# Patient Record
Sex: FEMALE | Race: OTHER | URBAN - METROPOLITAN AREA
[De-identification: names, ages, dates, MRNs, and addresses within clinical notes are randomized per-mention and may not be internally consistent; named-entity substitution may affect disease eponyms.]

---

## 2023-10-11 ENCOUNTER — APPOINTMENT (RX ONLY)
Dept: URBAN - METROPOLITAN AREA CLINIC 81 | Facility: CLINIC | Age: 83
Setting detail: DERMATOLOGY
End: 2023-10-11

## 2023-10-11 DIAGNOSIS — L57.0 ACTINIC KERATOSIS: ICD-10-CM

## 2023-10-11 PROCEDURE — ? PRESCRIPTION MEDICATION MANAGEMENT

## 2023-10-11 PROCEDURE — ? PRESCRIPTION

## 2023-10-11 PROCEDURE — ? COUNSELING

## 2023-10-11 PROCEDURE — 99204 OFFICE O/P NEW MOD 45 MIN: CPT

## 2023-10-11 RX ORDER — FLUOROURACIL 5 MG/G
CREAM TOPICAL TWICE DAILY
Qty: 40 | Refills: 1 | Status: ERX | COMMUNITY
Start: 2023-10-11

## 2023-10-11 RX ORDER — HYDROCORTISONE 25 MG/G
OINTMENT TOPICAL
Qty: 28.35 | Refills: 1 | Status: ERX | COMMUNITY
Start: 2023-10-11

## 2023-10-11 RX ADMIN — FLUOROURACIL: 5 CREAM TOPICAL at 00:00

## 2023-10-11 RX ADMIN — HYDROCORTISONE: 25 OINTMENT TOPICAL at 00:00

## 2023-10-11 ASSESSMENT — LOCATION DETAILED DESCRIPTION DERM
LOCATION DETAILED: RIGHT INFERIOR CENTRAL MALAR CHEEK
LOCATION DETAILED: LEFT INFERIOR MEDIAL MALAR CHEEK
LOCATION DETAILED: NASAL SUPRATIP
LOCATION DETAILED: LEFT MEDIAL FOREHEAD

## 2023-10-11 ASSESSMENT — LOCATION SIMPLE DESCRIPTION DERM
LOCATION SIMPLE: LEFT CHEEK
LOCATION SIMPLE: NOSE
LOCATION SIMPLE: LEFT FOREHEAD
LOCATION SIMPLE: RIGHT CHEEK

## 2023-10-11 ASSESSMENT — LOCATION ZONE DERM
LOCATION ZONE: NOSE
LOCATION ZONE: FACE

## 2023-10-11 NOTE — PROCEDURE: PRESCRIPTION MEDICATION MANAGEMENT
Detail Level: Zone
Plan: Follow up 3 weeks
Initiate Treatment: Pt to begin applying effudex 5% cream BID to affected areas on forehead, nose and cheeks x 2 weeks. \\nAfter treatment pt to apply hydrocortisone 2.5% ointment to treated areas BID x 1 week.
Render In Strict Bullet Format?: No

## 2023-10-17 ENCOUNTER — APPOINTMENT (RX ONLY)
Dept: URBAN - METROPOLITAN AREA CLINIC 81 | Facility: CLINIC | Age: 83
Setting detail: DERMATOLOGY
End: 2023-10-17

## 2023-10-17 DIAGNOSIS — Z41.9 ENCOUNTER FOR PROCEDURE FOR PURPOSES OTHER THAN REMEDYING HEALTH STATE, UNSPECIFIED: ICD-10-CM

## 2023-10-17 PROCEDURE — ? COSMETIC CONSULTATION: LASER RESURFACING

## 2023-10-17 PROCEDURE — ? RECOMMENDATIONS

## 2023-10-17 PROCEDURE — ? COSMETIC QUOTE

## 2023-10-17 NOTE — PROCEDURE: COSMETIC QUOTE
Injectable  19 Units: 0
Misc 5 Free Text Discount (In Dollars- Use Only Numbers And Decimals): 0.00
Injectable 1 Price/Unit (In Dollars- Use Only Numbers And Decimals): 650.00
Laser 13: IPL Photofacial-Face, Neck, Chest
Laser 6 Price/Unit (In Dollars- Use Only Numbers And Decimals): 1500.00
Injectable 5: Volbella 0.55cc
Misc Procedure 2 Price/Unit (In Dollars- Use Only Numbers And Decimals): 200.00
Injectable  12 Price/Unit (In Dollars- Use Only Numbers And Decimals): 12.00
Laser 10: CO2RE Intima
Misc Procedure 6: Threads Browlift
Injectable 9 Price/Unit (In Dollars- Use Only Numbers And Decimals): 800.00
Additional Note (Will Following Above Verbiage): Laser includes PRP and Defenage. Threads discount applied to lift only. If lift with 12 threads, will include 20 mono at no charge. \\n\\nPatient to pay deposit for Nov treatment. If decides for Dec, then must pay in full with no refund.
Laser 18 Price/Unit (In Dollars- Use Only Numbers And Decimals): 2000.00
Include Sales Tax On Surgeon's Fees: Yes
Injectable  13: Kybella/Vial
Laser 3 Price/Unit (In Dollars- Use Only Numbers And Decimals): 1800.00
Injectable 2: Versa
Apply Facility Fee: No
Face Procedure 1 Price/Unit (In Dollars- Use Only Numbers And Decimals): 250.00
Laser 7: VV/PPP on Genitals
Misc Procedure 3: Collagen Stimulating threads Per thread only
Laser 19: RUSH TROTTER
Misc Procedure 3 Units: [address]
Injectable 6 Price/Unit (In Dollars- Use Only Numbers And Decimals): 795.00
Misc Procedure 1: Threadlift Per Thread
Injectable 10: RHA 3
Misc Procedure 7 Price/Unit (In Dollars- Use Only Numbers And Decimals): 50.00
Injectable  14: Qwo/Session
Injectable 10 Price/Unit (In Dollars- Use Only Numbers And Decimals): 750.00
Injectable 3: Voluma
Laser 15 Price/Unit (In Dollars- Use Only Numbers And Decimals): 300.00
Misc Procedure 4: Threads Neck Lift
Laser 4 Price/Unit (In Dollars- Use Only Numbers And Decimals): 1250.00
Laser 8: Profound-Cheek Scarring
Laser 16 Price/Unit (In Dollars- Use Only Numbers And Decimals): 400.00
Laser 1 Price/Unit (In Dollars- Use Only Numbers And Decimals): 2500.00
Injectable  11: Botox
Laser 12 Price/Unit (In Dollars- Use Only Numbers And Decimals): 900.00
Laser 5: Lesion Removal
Laser 17: LHR-Arms
Injectable 8: [place]ro/Skinvive
Misc Procedure 1 Units: 12
Misc 1 Percentage Discount: 10
Laser 2: Full Face and Neck-Fusion
Laser 9 Price/Unit (In Dollars- Use Only Numbers And Decimals): 1000.00
Misc Procedure 2: Collagen threads-Lip - lips
Laser 2 Price/Unit (In Dollars- Use Only Numbers And Decimals): 3000.00
Injectable  12: Xeomin
Laser 6: Sk Removal-Face and Neck
Laser 18: LHR-Legs/Back-TBD
Injectable 5 Price/Unit (In Dollars- Use Only Numbers And Decimals): 395.00
Laser 3: Full Face-Mid Depth
Injectable 9: RHA 4
Injectable 2 Christianson/Unit (In Dollars- Use Only Numbers And Decimals): 600.00
Laser 14: LHR-Lip
Injectable 6: Volbella 1cc
Misc Procedure 3 Price/Unit (In Dollars- Use Only Numbers And Decimals): 10.00
Face Procedure 1: mole removal
Misc Procedure 7: [address]
Laser 11: IPL Photofacial
Laser 4: Full Face-Light
Laser 11 Price/Unit (In Dollars- Use Only Numbers And Decimals): 500.00
Facility Fee Units (Optional): 1
Injectable 7: [facility]
Face Procedure 2: punch excision-lip
Laser 16: Teagan
Laser 15: Sukumar Thompson
Laser 1: Full Face-Fusion
Detail Level: Zone
Laser 12: IPL Photoface-Face and Neck
Misc Procedure 8: Thread for pox scarring-cheeks
Injectable 4: Volux
Injectable  11 Price/Unit (In Dollars- Use Only Numbers And Decimals): 6. [address]
Misc Procedure 5: Threads Eyelift
Misc Procedure 1 Price/Unit (In Dollars- Use Only Numbers And Decimals): 225.00
Laser 9: CO2RE- Pox scarring-cheeks
Injectable 1: Radiesse
Injectable 8 Price/Unit (In Dollars- Use Only Numbers And Decimals): 550.00

## 2023-10-17 NOTE — PROCEDURE: RECOMMENDATIONS
Recommendation Preamble: The following recommendations were made during the visit:
Render Risk Assessment In Note?: no
Detail Level: Zone
Recommendations (Free Text): Can prescribe Xanax for day of treatment.   \\n\\nRecommend patient seek a consult with for blepharoplasty ([facility] or [facility])\\n\\nMore extreme rejuvenation;  seek plastic surgeon for facelift consult

## 2023-10-30 ENCOUNTER — APPOINTMENT (RX ONLY)
Dept: URBAN - METROPOLITAN AREA CLINIC 81 | Facility: CLINIC | Age: 83
Setting detail: DERMATOLOGY
End: 2023-10-30

## 2023-10-30 DIAGNOSIS — L57.0 ACTINIC KERATOSIS: ICD-10-CM | Status: RESOLVING

## 2023-10-30 PROCEDURE — ? COUNSELING

## 2023-10-30 PROCEDURE — ? PRESCRIPTION MEDICATION MANAGEMENT

## 2023-10-30 PROCEDURE — 99213 OFFICE O/P EST LOW 20 MIN: CPT

## 2023-10-30 ASSESSMENT — LOCATION DETAILED DESCRIPTION DERM
LOCATION DETAILED: RIGHT INFERIOR CENTRAL MALAR CHEEK
LOCATION DETAILED: LEFT INFERIOR MEDIAL MALAR CHEEK
LOCATION DETAILED: NASAL SUPRATIP
LOCATION DETAILED: LEFT MEDIAL MALAR CHEEK
LOCATION DETAILED: LEFT MEDIAL FOREHEAD

## 2023-10-30 ASSESSMENT — LOCATION ZONE DERM
LOCATION ZONE: NOSE
LOCATION ZONE: FACE

## 2023-10-30 ASSESSMENT — LOCATION SIMPLE DESCRIPTION DERM
LOCATION SIMPLE: RIGHT CHEEK
LOCATION SIMPLE: NOSE
LOCATION SIMPLE: LEFT CHEEK
LOCATION SIMPLE: LEFT FOREHEAD

## 2023-10-30 NOTE — PROCEDURE: PRESCRIPTION MEDICATION MANAGEMENT
Detail Level: Zone
Plan: Follow up 3 weeks
Discontinue Regimen: Efudex
Continue Regimen: hydrocortisone 2.5% ointment to treated areas BID x 1 week
Render In Strict Bullet Format?: No

## 2025-04-15 ENCOUNTER — APPOINTMENT (OUTPATIENT)
Dept: URBAN - METROPOLITAN AREA CLINIC 81 | Facility: CLINIC | Age: 85
Setting detail: DERMATOLOGY
End: 2025-04-15

## 2025-04-15 DIAGNOSIS — L82.0 INFLAMED SEBORRHEIC KERATOSIS: ICD-10-CM

## 2025-04-15 DIAGNOSIS — L57.8 OTHER SKIN CHANGES DUE TO CHRONIC EXPOSURE TO NONIONIZING RADIATION: ICD-10-CM

## 2025-04-15 DIAGNOSIS — Z41.9 ENCOUNTER FOR PROCEDURE FOR PURPOSES OTHER THAN REMEDYING HEALTH STATE, UNSPECIFIED: ICD-10-CM

## 2025-04-15 PROCEDURE — ? ADDITIONAL NOTES

## 2025-04-15 PROCEDURE — ? LIQUID NITROGEN

## 2025-04-15 PROCEDURE — 17110 DESTRUCTION B9 LES UP TO 14: CPT

## 2025-04-15 PROCEDURE — ? COUNSELING

## 2025-04-15 PROCEDURE — ? SUNSCREEN RECOMMENDATIONS

## 2025-04-15 PROCEDURE — 99213 OFFICE O/P EST LOW 20 MIN: CPT | Mod: 25

## 2025-04-15 ASSESSMENT — LOCATION DETAILED DESCRIPTION DERM: LOCATION DETAILED: RIGHT SUPERIOR CENTRAL MALAR CHEEK

## 2025-04-15 ASSESSMENT — LOCATION SIMPLE DESCRIPTION DERM: LOCATION SIMPLE: RIGHT CHEEK

## 2025-04-15 ASSESSMENT — LOCATION ZONE DERM: LOCATION ZONE: FACE

## 2025-04-15 NOTE — PROCEDURE: LIQUID NITROGEN
Consent: The patient's consent was obtained including but not limited to risks of crusting, scabbing, blistering, scarring, darker or lighter pigmentary change, recurrence, incomplete removal and infection.
Medical Necessity Clause: This procedure was medically necessary because the lesions that were treated were:
Medical Necessity Information: It is in your best interest to select a reason for this procedure from the list below. All of these items fulfill various CMS LCD requirements except the new and changing color options.
Render Note In Bullet Format When Appropriate: No
Show Applicator Variable?: Yes
Detail Level: Detailed
Post-Care Instructions: I reviewed with the patient in detail post-care instructions. Patient is to wear sunprotection, and avoid picking at any of the treated lesions. Pt may apply Vaseline to crusted or scabbing areas.
Spray Paint Text: The liquid nitrogen was applied to the skin utilizing a spray paint frosting technique.

## 2025-04-15 NOTE — PROCEDURE: ADDITIONAL NOTES
Render Risk Assessment In Note?: no
Detail Level: Simple
Additional Notes: PT referred to have an appointment with HARPER judd in regards to cosmetic treatment in the face.